# Patient Record
Sex: FEMALE | Race: AMERICAN INDIAN OR ALASKA NATIVE | NOT HISPANIC OR LATINO
[De-identification: names, ages, dates, MRNs, and addresses within clinical notes are randomized per-mention and may not be internally consistent; named-entity substitution may affect disease eponyms.]

---

## 2017-12-14 ENCOUNTER — RESULT REVIEW (OUTPATIENT)
Age: 47
End: 2017-12-14

## 2018-02-23 PROBLEM — Z00.00 ENCOUNTER FOR PREVENTIVE HEALTH EXAMINATION: Status: ACTIVE | Noted: 2018-02-23

## 2018-04-03 ENCOUNTER — APPOINTMENT (OUTPATIENT)
Dept: OBGYN | Facility: CLINIC | Age: 48
End: 2018-04-03

## 2018-05-01 ENCOUNTER — APPOINTMENT (OUTPATIENT)
Dept: OBGYN | Facility: CLINIC | Age: 48
End: 2018-05-01
Payer: COMMERCIAL

## 2018-05-01 PROCEDURE — 99204 OFFICE O/P NEW MOD 45 MIN: CPT

## 2019-03-07 ENCOUNTER — APPOINTMENT (OUTPATIENT)
Dept: OBGYN | Facility: CLINIC | Age: 49
End: 2019-03-07
Payer: COMMERCIAL

## 2019-03-07 ENCOUNTER — APPOINTMENT (OUTPATIENT)
Dept: OBGYN | Facility: CLINIC | Age: 49
End: 2019-03-07

## 2019-03-07 PROCEDURE — 99215 OFFICE O/P EST HI 40 MIN: CPT

## 2019-06-17 ENCOUNTER — OUTPATIENT (OUTPATIENT)
Dept: OUTPATIENT SERVICES | Facility: HOSPITAL | Age: 49
LOS: 1 days | End: 2019-06-17
Payer: COMMERCIAL

## 2019-06-17 VITALS
DIASTOLIC BLOOD PRESSURE: 79 MMHG | OXYGEN SATURATION: 100 % | HEIGHT: 65 IN | WEIGHT: 166.89 LBS | HEART RATE: 79 BPM | RESPIRATION RATE: 20 BRPM | SYSTOLIC BLOOD PRESSURE: 116 MMHG | TEMPERATURE: 99 F

## 2019-06-17 DIAGNOSIS — Z01.818 ENCOUNTER FOR OTHER PREPROCEDURAL EXAMINATION: ICD-10-CM

## 2019-06-17 DIAGNOSIS — D50.8 OTHER IRON DEFICIENCY ANEMIAS: ICD-10-CM

## 2019-06-17 DIAGNOSIS — D25.1 INTRAMURAL LEIOMYOMA OF UTERUS: ICD-10-CM

## 2019-06-17 DIAGNOSIS — Z98.890 OTHER SPECIFIED POSTPROCEDURAL STATES: Chronic | ICD-10-CM

## 2019-06-17 DIAGNOSIS — D25.9 LEIOMYOMA OF UTERUS, UNSPECIFIED: ICD-10-CM

## 2019-06-17 LAB
ANION GAP SERPL CALC-SCNC: 15 MMOL/L — SIGNIFICANT CHANGE UP (ref 5–17)
BLD GP AB SCN SERPL QL: NEGATIVE — SIGNIFICANT CHANGE UP
BUN SERPL-MCNC: 9 MG/DL — SIGNIFICANT CHANGE UP (ref 7–23)
CALCIUM SERPL-MCNC: 9.6 MG/DL — SIGNIFICANT CHANGE UP (ref 8.4–10.5)
CHLORIDE SERPL-SCNC: 101 MMOL/L — SIGNIFICANT CHANGE UP (ref 96–108)
CO2 SERPL-SCNC: 23 MMOL/L — SIGNIFICANT CHANGE UP (ref 22–31)
CREAT SERPL-MCNC: 0.66 MG/DL — SIGNIFICANT CHANGE UP (ref 0.5–1.3)
GLUCOSE SERPL-MCNC: 81 MG/DL — SIGNIFICANT CHANGE UP (ref 70–99)
HBA1C BLD-MCNC: 5.4 % — SIGNIFICANT CHANGE UP (ref 4–5.6)
POTASSIUM SERPL-MCNC: 4 MMOL/L — SIGNIFICANT CHANGE UP (ref 3.5–5.3)
POTASSIUM SERPL-SCNC: 4 MMOL/L — SIGNIFICANT CHANGE UP (ref 3.5–5.3)
RH IG SCN BLD-IMP: POSITIVE — SIGNIFICANT CHANGE UP
SODIUM SERPL-SCNC: 139 MMOL/L — SIGNIFICANT CHANGE UP (ref 135–145)

## 2019-06-17 PROCEDURE — 86901 BLOOD TYPING SEROLOGIC RH(D): CPT

## 2019-06-17 PROCEDURE — G0463: CPT

## 2019-06-17 PROCEDURE — 80048 BASIC METABOLIC PNL TOTAL CA: CPT

## 2019-06-17 PROCEDURE — 85027 COMPLETE CBC AUTOMATED: CPT

## 2019-06-17 PROCEDURE — 83036 HEMOGLOBIN GLYCOSYLATED A1C: CPT

## 2019-06-17 PROCEDURE — 86900 BLOOD TYPING SEROLOGIC ABO: CPT

## 2019-06-17 PROCEDURE — 86850 RBC ANTIBODY SCREEN: CPT

## 2019-06-17 RX ORDER — LIDOCAINE HCL 20 MG/ML
0.2 VIAL (ML) INJECTION ONCE
Refills: 0 | Status: DISCONTINUED | OUTPATIENT
Start: 2019-06-24 | End: 2019-06-25

## 2019-06-17 RX ORDER — CEFOTETAN DISODIUM 1 G
2 VIAL (EA) INJECTION ONCE
Refills: 0 | Status: DISCONTINUED | OUTPATIENT
Start: 2019-06-24 | End: 2019-06-25

## 2019-06-17 RX ORDER — SODIUM CHLORIDE 9 MG/ML
3 INJECTION INTRAMUSCULAR; INTRAVENOUS; SUBCUTANEOUS EVERY 8 HOURS
Refills: 0 | Status: DISCONTINUED | OUTPATIENT
Start: 2019-06-24 | End: 2019-06-25

## 2019-06-17 RX ORDER — CHOLECALCIFEROL (VITAMIN D3) 125 MCG
1 CAPSULE ORAL
Qty: 0 | Refills: 0 | DISCHARGE

## 2019-06-17 NOTE — H&P PST ADULT - NSICDXPASTSURGICALHX_GEN_ALL_CORE_FT
PAST SURGICAL HISTORY:  S/P  Section 2003 PAST SURGICAL HISTORY:  H/O carpal tunnel repair right    History of varicose vein stripping     S/P  Section

## 2019-06-17 NOTE — H&P PST ADULT - HISTORY OF PRESENT ILLNESS
49 year old female with h/o intramural leiomyoma of uterus, is been seeing in preadmission testing for scheduled total laparoscopic hysterectomy, laparoscopic bilateral salpingectomy, possible exploratory laparotomy. Her past medical history also includes iron deficiency anemia, goiter, varicose veins, constipation, occasional heartburn.

## 2019-06-17 NOTE — H&P PST ADULT - NSICDXPROBLEM_GEN_ALL_CORE_FT
PROBLEM DIAGNOSES  Problem: Uterine leiomyoma  Assessment and Plan: scheduled for total laparoscopic hysterectomy, laparoscopic bilateral salpingectomy, possible exploratory laparotomy.  ucg on admit, urine cup given  fingerstick on admit  preop instruction including chlorhexidine wash instruction given     Problem: Other iron deficiency anemia  Assessment and Plan: monitor

## 2019-06-17 NOTE — H&P PST ADULT - NSANTHOSAYNRD_GEN_A_CORE
No. CANDIDO screening performed.  STOP BANG Legend: 0-2 = LOW Risk; 3-4 = INTERMEDIATE Risk; 5-8 = HIGH Risk

## 2019-06-17 NOTE — H&P PST ADULT - NSICDXPASTMEDICALHX_GEN_ALL_CORE_FT
PAST MEDICAL HISTORY:   History Elective fetus + Down Syndrom 2010    Bilateral dry eyes     Dry skin     Febrile seizure as a child    Fibroids     Goiter as a child    H/O varicose veins     Heartburn

## 2019-06-18 LAB
HCT VFR BLD CALC: 38.2 % — SIGNIFICANT CHANGE UP (ref 34.5–45)
HGB BLD-MCNC: 11.7 G/DL — SIGNIFICANT CHANGE UP (ref 11.5–15.5)
MCHC RBC-ENTMCNC: 27 PG — SIGNIFICANT CHANGE UP (ref 27–34)
MCHC RBC-ENTMCNC: 30.6 GM/DL — LOW (ref 32–36)
MCV RBC AUTO: 88 FL — SIGNIFICANT CHANGE UP (ref 80–100)
PLATELET # BLD AUTO: 265 K/UL — SIGNIFICANT CHANGE UP (ref 150–400)
RBC # BLD: 4.34 M/UL — SIGNIFICANT CHANGE UP (ref 3.8–5.2)
RBC # FLD: 15.1 % — HIGH (ref 10.3–14.5)
WBC # BLD: 11.61 K/UL — HIGH (ref 3.8–10.5)
WBC # FLD AUTO: 11.61 K/UL — HIGH (ref 3.8–10.5)

## 2019-06-23 ENCOUNTER — TRANSCRIPTION ENCOUNTER (OUTPATIENT)
Age: 49
End: 2019-06-23

## 2019-06-24 ENCOUNTER — RESULT REVIEW (OUTPATIENT)
Age: 49
End: 2019-06-24

## 2019-06-24 ENCOUNTER — INPATIENT (INPATIENT)
Facility: HOSPITAL | Age: 49
LOS: 0 days | Discharge: HOME | DRG: 743 | End: 2019-06-25
Attending: OBSTETRICS & GYNECOLOGY | Admitting: OBSTETRICS & GYNECOLOGY
Payer: COMMERCIAL

## 2019-06-24 VITALS
OXYGEN SATURATION: 100 % | TEMPERATURE: 98 F | RESPIRATION RATE: 20 BRPM | HEIGHT: 65 IN | WEIGHT: 166.89 LBS | DIASTOLIC BLOOD PRESSURE: 78 MMHG | HEART RATE: 77 BPM | SYSTOLIC BLOOD PRESSURE: 120 MMHG

## 2019-06-24 DIAGNOSIS — D25.1 INTRAMURAL LEIOMYOMA OF UTERUS: ICD-10-CM

## 2019-06-24 DIAGNOSIS — Z98.890 OTHER SPECIFIED POSTPROCEDURAL STATES: Chronic | ICD-10-CM

## 2019-06-24 LAB
GLUCOSE BLDC GLUCOMTR-MCNC: 83 MG/DL — SIGNIFICANT CHANGE UP (ref 70–99)
HCG UR QL: NEGATIVE — SIGNIFICANT CHANGE UP
HCT VFR BLD CALC: 30.8 % — LOW (ref 34.5–45)
HGB BLD-MCNC: 10 G/DL — LOW (ref 11.5–15.5)
MCHC RBC-ENTMCNC: 27.5 PG — SIGNIFICANT CHANGE UP (ref 27–34)
MCHC RBC-ENTMCNC: 32.3 GM/DL — SIGNIFICANT CHANGE UP (ref 32–36)
MCV RBC AUTO: 85.2 FL — SIGNIFICANT CHANGE UP (ref 80–100)
PLATELET # BLD AUTO: 273 K/UL — SIGNIFICANT CHANGE UP (ref 150–400)
RBC # BLD: 3.62 M/UL — LOW (ref 3.8–5.2)
RBC # FLD: 14.3 % — SIGNIFICANT CHANGE UP (ref 10.3–14.5)
WBC # BLD: 17.3 K/UL — HIGH (ref 3.8–10.5)
WBC # FLD AUTO: 17.3 K/UL — HIGH (ref 3.8–10.5)

## 2019-06-24 PROCEDURE — 50715 RELEASE OF URETER: CPT | Mod: RT

## 2019-06-24 PROCEDURE — 88302 TISSUE EXAM BY PATHOLOGIST: CPT | Mod: 26

## 2019-06-24 PROCEDURE — 58542 LSH W/T/O UT 250 G OR LESS: CPT

## 2019-06-24 PROCEDURE — 88307 TISSUE EXAM BY PATHOLOGIST: CPT | Mod: 26

## 2019-06-24 RX ORDER — HYDROMORPHONE HYDROCHLORIDE 2 MG/ML
0.5 INJECTION INTRAMUSCULAR; INTRAVENOUS; SUBCUTANEOUS
Refills: 0 | Status: DISCONTINUED | OUTPATIENT
Start: 2019-06-24 | End: 2019-06-25

## 2019-06-24 RX ORDER — KETOROLAC TROMETHAMINE 30 MG/ML
30 SYRINGE (ML) INJECTION EVERY 8 HOURS
Refills: 0 | Status: DISCONTINUED | OUTPATIENT
Start: 2019-06-24 | End: 2019-06-24

## 2019-06-24 RX ORDER — ONDANSETRON 8 MG/1
4 TABLET, FILM COATED ORAL ONCE
Refills: 0 | Status: DISCONTINUED | OUTPATIENT
Start: 2019-06-24 | End: 2019-06-25

## 2019-06-24 RX ORDER — SODIUM CHLORIDE 9 MG/ML
1000 INJECTION, SOLUTION INTRAVENOUS
Refills: 0 | Status: DISCONTINUED | OUTPATIENT
Start: 2019-06-24 | End: 2019-06-25

## 2019-06-24 RX ORDER — ENOXAPARIN SODIUM 100 MG/ML
40 INJECTION SUBCUTANEOUS DAILY
Refills: 0 | Status: DISCONTINUED | OUTPATIENT
Start: 2019-06-25 | End: 2019-06-25

## 2019-06-24 RX ORDER — CELECOXIB 200 MG/1
200 CAPSULE ORAL ONCE
Refills: 0 | Status: COMPLETED | OUTPATIENT
Start: 2019-06-24 | End: 2019-06-24

## 2019-06-24 RX ORDER — ACETAMINOPHEN 500 MG
975 TABLET ORAL ONCE
Refills: 0 | Status: COMPLETED | OUTPATIENT
Start: 2019-06-24 | End: 2019-06-24

## 2019-06-24 RX ORDER — ACETAMINOPHEN 500 MG
1000 TABLET ORAL EVERY 6 HOURS
Refills: 0 | Status: DISCONTINUED | OUTPATIENT
Start: 2019-06-24 | End: 2019-06-25

## 2019-06-24 RX ORDER — CHLORHEXIDINE GLUCONATE 213 G/1000ML
1 SOLUTION TOPICAL ONCE
Refills: 0 | Status: COMPLETED | OUTPATIENT
Start: 2019-06-24 | End: 2019-06-24

## 2019-06-24 RX ORDER — FAMOTIDINE 10 MG/ML
20 INJECTION INTRAVENOUS ONCE
Refills: 0 | Status: COMPLETED | OUTPATIENT
Start: 2019-06-24 | End: 2019-06-24

## 2019-06-24 RX ORDER — HEPARIN SODIUM 5000 [USP'U]/ML
5000 INJECTION INTRAVENOUS; SUBCUTANEOUS EVERY 12 HOURS
Refills: 0 | Status: COMPLETED | OUTPATIENT
Start: 2019-06-24 | End: 2019-06-24

## 2019-06-24 RX ORDER — HEPARIN SODIUM 5000 [USP'U]/ML
5000 INJECTION INTRAVENOUS; SUBCUTANEOUS ONCE
Refills: 0 | Status: DISCONTINUED | OUTPATIENT
Start: 2019-06-24 | End: 2019-06-24

## 2019-06-24 RX ORDER — OXYCODONE HYDROCHLORIDE 5 MG/1
5 TABLET ORAL EVERY 4 HOURS
Refills: 0 | Status: DISCONTINUED | OUTPATIENT
Start: 2019-06-24 | End: 2019-06-24

## 2019-06-24 RX ADMIN — HYDROMORPHONE HYDROCHLORIDE 0.5 MILLIGRAM(S): 2 INJECTION INTRAMUSCULAR; INTRAVENOUS; SUBCUTANEOUS at 18:45

## 2019-06-24 RX ADMIN — HYDROMORPHONE HYDROCHLORIDE 0.5 MILLIGRAM(S): 2 INJECTION INTRAMUSCULAR; INTRAVENOUS; SUBCUTANEOUS at 18:30

## 2019-06-24 RX ADMIN — HEPARIN SODIUM 5000 UNIT(S): 5000 INJECTION INTRAVENOUS; SUBCUTANEOUS at 22:04

## 2019-06-24 RX ADMIN — SODIUM CHLORIDE 3 MILLILITER(S): 9 INJECTION INTRAMUSCULAR; INTRAVENOUS; SUBCUTANEOUS at 21:09

## 2019-06-24 RX ADMIN — CELECOXIB 200 MILLIGRAM(S): 200 CAPSULE ORAL at 12:31

## 2019-06-24 RX ADMIN — SODIUM CHLORIDE 125 MILLILITER(S): 9 INJECTION, SOLUTION INTRAVENOUS at 19:23

## 2019-06-24 RX ADMIN — FAMOTIDINE 20 MILLIGRAM(S): 10 INJECTION INTRAVENOUS at 12:31

## 2019-06-24 RX ADMIN — SODIUM CHLORIDE 125 MILLILITER(S): 9 INJECTION, SOLUTION INTRAVENOUS at 22:04

## 2019-06-24 RX ADMIN — CHLORHEXIDINE GLUCONATE 1 APPLICATION(S): 213 SOLUTION TOPICAL at 12:31

## 2019-06-24 NOTE — BRIEF OPERATIVE NOTE - NSICDXBRIEFPROCEDURE_GEN_ALL_CORE_FT
PROCEDURES:  Right ureterolysis 24-Jun-2019 17:12:02  Grisel Carmona  Laparoscopic lysis of adhesions of pelvis 24-Jun-2019 17:11:05  Grisel Carmona  Laparoscopic supracervical hysterectomy with salpingectomy 24-Jun-2019 17:10:13  Grisel Carmona

## 2019-06-24 NOTE — PRE-ANESTHESIA EVALUATION ADULT - NSPROPOSEDPROCEDFT_GEN_ALL_CORE
total laparoscopic hysterectomy, laparoscopic bilateral salpingooophorectomy, possible exploratory laparotomy Total Laparoscopic Hysterectomy, Laparoscopic Bilateral Salpingooophorectomy, possible exploratory laparotomy

## 2019-06-24 NOTE — BRIEF OPERATIVE NOTE - NSICDXBRIEFPOSTOP_GEN_ALL_CORE_FT
POST-OP DIAGNOSIS:  Menorrhagia 24-Jun-2019 17:10:44  Grisel Carmona  Fibroid uterus 24-Jun-2019 17:10:50  Grisel Carmona

## 2019-06-24 NOTE — CHART NOTE - NSCHARTNOTEFT_GEN_A_CORE
R1 OBGYN Post-operative Note: POD#0  HD#1    Patient seen and examined at bedside.  No acute events overnight. No acute complaints.  Pain well controlled.  Patient is ambulating and tolerating.....   Has not yet passed flatus vs. Patient is passing flatus.    Patient is voiding spontaneously vs. Villanueva is still in place.   Denies CP, SOB, N/V, fevers, and chills.    Vital Signs Last 24 Hours  T(C): 36.9 (06-24-19 @ 19:30), Max: 36.9 (06-24-19 @ 19:30)  HR: 77 (06-24-19 @ 19:45) (74 - 96)  BP: 101/58 (06-24-19 @ 19:45) (92/53 - 120/78)  RR: 16 (06-24-19 @ 19:45) (15 - 20)  SpO2: 99% (06-24-19 @ 19:45) (95% - 100%)    I&O's Summary    24 Jun 2019 07:01  -  24 Jun 2019 21:37  --------------------------------------------------------  IN: 600 mL / OUT: 0 mL / NET: 600 mL        Physical Exam:  General: NAD  CV: RR, S1, S2, no M/R/G  Lungs: CTA b/l, good air flow b/l   Abdomen: Soft, mildly-tender to palpation diffusely, softly distended, normoactive bowel sounds  Incision: _ CDI  : no bleeding on pad  Ext: No pain or swelling     Labs:                        10.0   17.3  )-----------( 273      ( 24 Jun 2019 20:34 )             30.8   baso x      eos x      imm gran x      lymph x      mono x      poly x          MEDICATIONS  (STANDING):  acetaminophen  IVPB .. 1000 milliGRAM(s) IV Intermittent every 6 hours  cefoTEtan  IVPB 2 Gram(s) IV Intermittent once  heparin  Injectable 5000 Unit(s) SubCutaneous every 12 hours  lactated ringers. 1000 milliLiter(s) (125 mL/Hr) IV Continuous <Continuous>  lidocaine 1% Injectable 0.2 milliLiter(s) Local Injection once  oxyCODONE    IR 5 milliGRAM(s) Oral every 4 hours  sodium chloride 0.9% lock flush 3 milliLiter(s) IV Push every 8 hours    MEDICATIONS  (PRN):  HYDROmorphone  Injectable 0.5 milliGRAM(s) IV Push every 10 minutes PRN Moderate to severe Pain (4 - 10)  ondansetron Injectable 4 milliGRAM(s) IV Push once PRN Nausea and/or Vomiting      A/P: 49y POD#  s/p     for .  Patient is stable and doing well.      Neuro: PO pain meds.   CV: Hemodynamically stable  Pulm: Saturating well on room air, encourage oob/amb  GI: Advance to regular diet vs. Continue regular diet  : UOP adequate, d/c villanueva   vs. Voiding spontaneously   Heme: c/w HSQ and SCDs for DVT ppx  FEN: LR@125.  replete electrolytes prn   ID: Afebrile  Endo: No active issues   Dispo: Continue routine post-op care    Ameena Del Cid PGY-1 R1 OBGYN Post-operative Note: POD#0  HD#1    Patient seen and examined at bedside. No acute complaints. Abdominal pain well controlled. Patient has not been out of bed. Is tolerating clear liquids. Due to void. Denies CP, SOB, N/V, fevers, and chills.    Vital Signs Last 24 Hours  T(C): 36.9 (06-24-19 @ 19:30), Max: 36.9 (06-24-19 @ 19:30)  HR: 77 (06-24-19 @ 19:45) (74 - 96)  BP: 101/58 (06-24-19 @ 19:45) (92/53 - 120/78)  RR: 16 (06-24-19 @ 19:45) (15 - 20)  SpO2: 99% (06-24-19 @ 19:45) (95% - 100%)    I&O's Summary    24 Jun 2019 07:01  -  24 Jun 2019 21:37  --------------------------------------------------------  IN: 600 mL / OUT: 0 mL / NET: 600 mL      Physical Exam:  General: NAD  CV: RR, S1, S2, no M/R/G  Lungs: CTA b/l, good air flow b/l   Abdomen: Soft, mildly-tender to palpation diffusely, softly distended  Incision: Four LSC incisions are CDI; mini-lap CDI  : no bleeding on pad  Ext: No pain or swelling     Labs:                        10.0   17.3  )-----------( 273      ( 24 Jun 2019 20:34 )             30.8   baso x      eos x      imm gran x      lymph x      mono x      poly x          MEDICATIONS  (STANDING):  acetaminophen  IVPB .. 1000 milliGRAM(s) IV Intermittent every 6 hours  cefoTEtan  IVPB 2 Gram(s) IV Intermittent once  heparin  Injectable 5000 Unit(s) SubCutaneous every 12 hours  lactated ringers. 1000 milliLiter(s) (125 mL/Hr) IV Continuous <Continuous>  lidocaine 1% Injectable 0.2 milliLiter(s) Local Injection once  oxyCODONE    IR 5 milliGRAM(s) Oral every 4 hours  sodium chloride 0.9% lock flush 3 milliLiter(s) IV Push every 8 hours    MEDICATIONS  (PRN):  HYDROmorphone  Injectable 0.5 milliGRAM(s) IV Push every 10 minutes PRN Moderate to severe Pain (4 - 10)  ondansetron Injectable 4 milliGRAM(s) IV Push once PRN Nausea and/or Vomiting      A/P: 49y POD#  s/p LSC Supracervical Hysterectomy, Bilateral salpingectomy, and mini-laparotomy for fibroid uterus.  Patient is stable and doing well post-operatively.    Neuro: IV Tylenol, IV toradol x2 doses; will transition to PO meds in AM  CV: Hemodynamically stable  Pulm: Saturating well on room air, encourage oob/amb  GI: Continue regular diet  : Due to void  Heme: Follwing one dose of HSQ, patient will receive Lovenox 40 daily starting tomorrow 7am; continue SCDs for DVT ppx  FEN: LR@125.  Replete electrolytes prn   ID: Afebrile  Endo: No active issues   Dispo: Continue routine post-op care    Ameena Del Cid PGY-2

## 2019-06-24 NOTE — BRIEF OPERATIVE NOTE - NSICDXBRIEFPREOP_GEN_ALL_CORE_FT
PRE-OP DIAGNOSIS:  Menorrhagia 24-Jun-2019 17:10:33  Grisel Carmona  Fibroid uterus 24-Jun-2019 17:10:27  Grisel Carmona

## 2019-06-24 NOTE — BRIEF OPERATIVE NOTE - OPERATION/FINDINGS
approximately 16-18 week size multifibroid uterus adherent to anterior abdominal wall, bilateral adenxa wnl, Right pelvic side wall opened and ureter identified, left ureter identified, bladder adherent to BETH back filled and taken down, mini-lap performed for specimen removal, evicel applied over operative bed

## 2019-06-25 ENCOUNTER — TRANSCRIPTION ENCOUNTER (OUTPATIENT)
Age: 49
End: 2019-06-25

## 2019-06-25 VITALS
RESPIRATION RATE: 18 BRPM | DIASTOLIC BLOOD PRESSURE: 72 MMHG | SYSTOLIC BLOOD PRESSURE: 115 MMHG | TEMPERATURE: 98 F | OXYGEN SATURATION: 94 % | HEART RATE: 95 BPM

## 2019-06-25 DIAGNOSIS — Z98.890 OTHER SPECIFIED POSTPROCEDURAL STATES: ICD-10-CM

## 2019-06-25 LAB
HCT VFR BLD CALC: 27.7 % — LOW (ref 34.5–45)
HCT VFR BLD CALC: 28.9 % — LOW (ref 34.5–45)
HGB BLD-MCNC: 9 G/DL — LOW (ref 11.5–15.5)
HGB BLD-MCNC: 9.7 G/DL — LOW (ref 11.5–15.5)
MCHC RBC-ENTMCNC: 28 PG — SIGNIFICANT CHANGE UP (ref 27–34)
MCHC RBC-ENTMCNC: 28.8 PG — SIGNIFICANT CHANGE UP (ref 27–34)
MCHC RBC-ENTMCNC: 32.6 GM/DL — SIGNIFICANT CHANGE UP (ref 32–36)
MCHC RBC-ENTMCNC: 33.4 GM/DL — SIGNIFICANT CHANGE UP (ref 32–36)
MCV RBC AUTO: 85.9 FL — SIGNIFICANT CHANGE UP (ref 80–100)
MCV RBC AUTO: 86 FL — SIGNIFICANT CHANGE UP (ref 80–100)
PLATELET # BLD AUTO: 235 K/UL — SIGNIFICANT CHANGE UP (ref 150–400)
PLATELET # BLD AUTO: 263 K/UL — SIGNIFICANT CHANGE UP (ref 150–400)
RBC # BLD: 3.22 M/UL — LOW (ref 3.8–5.2)
RBC # BLD: 3.36 M/UL — LOW (ref 3.8–5.2)
RBC # FLD: 14.2 % — SIGNIFICANT CHANGE UP (ref 10.3–14.5)
RBC # FLD: 14.3 % — SIGNIFICANT CHANGE UP (ref 10.3–14.5)
WBC # BLD: 12.2 K/UL — HIGH (ref 3.8–10.5)
WBC # BLD: 12.8 K/UL — HIGH (ref 3.8–10.5)
WBC # FLD AUTO: 12.2 K/UL — HIGH (ref 3.8–10.5)
WBC # FLD AUTO: 12.8 K/UL — HIGH (ref 3.8–10.5)

## 2019-06-25 PROCEDURE — 82962 GLUCOSE BLOOD TEST: CPT

## 2019-06-25 PROCEDURE — 85027 COMPLETE CBC AUTOMATED: CPT

## 2019-06-25 PROCEDURE — C1889: CPT

## 2019-06-25 PROCEDURE — 88302 TISSUE EXAM BY PATHOLOGIST: CPT

## 2019-06-25 PROCEDURE — 81025 URINE PREGNANCY TEST: CPT

## 2019-06-25 PROCEDURE — 88307 TISSUE EXAM BY PATHOLOGIST: CPT

## 2019-06-25 RX ORDER — HYDROMORPHONE HYDROCHLORIDE 2 MG/ML
2 INJECTION INTRAMUSCULAR; INTRAVENOUS; SUBCUTANEOUS EVERY 4 HOURS
Refills: 0 | Status: DISCONTINUED | OUTPATIENT
Start: 2019-06-25 | End: 2019-06-25

## 2019-06-25 RX ORDER — ENOXAPARIN SODIUM 100 MG/ML
40 INJECTION SUBCUTANEOUS
Qty: 14 | Refills: 0
Start: 2019-06-25 | End: 2019-07-08

## 2019-06-25 RX ORDER — IBUPROFEN 200 MG
600 TABLET ORAL EVERY 6 HOURS
Refills: 0 | Status: DISCONTINUED | OUTPATIENT
Start: 2019-06-25 | End: 2019-06-25

## 2019-06-25 RX ORDER — IBUPROFEN 200 MG
3 TABLET ORAL
Qty: 0 | Refills: 0 | DISCHARGE

## 2019-06-25 RX ORDER — ACETAMINOPHEN 500 MG
650 TABLET ORAL EVERY 6 HOURS
Refills: 0 | Status: DISCONTINUED | OUTPATIENT
Start: 2019-06-25 | End: 2019-06-25

## 2019-06-25 RX ORDER — HYDROMORPHONE HYDROCHLORIDE 2 MG/ML
1 INJECTION INTRAMUSCULAR; INTRAVENOUS; SUBCUTANEOUS
Qty: 10 | Refills: 0
Start: 2019-06-25

## 2019-06-25 RX ORDER — HYDROMORPHONE HYDROCHLORIDE 2 MG/ML
1 INJECTION INTRAMUSCULAR; INTRAVENOUS; SUBCUTANEOUS
Qty: 0 | Refills: 0 | DISCHARGE

## 2019-06-25 RX ADMIN — Medication 600 MILLIGRAM(S): at 11:52

## 2019-06-25 RX ADMIN — ENOXAPARIN SODIUM 40 MILLIGRAM(S): 100 INJECTION SUBCUTANEOUS at 11:22

## 2019-06-25 RX ADMIN — Medication 600 MILLIGRAM(S): at 11:22

## 2019-06-25 RX ADMIN — SODIUM CHLORIDE 3 MILLILITER(S): 9 INJECTION INTRAMUSCULAR; INTRAVENOUS; SUBCUTANEOUS at 04:41

## 2019-06-25 NOTE — PROGRESS NOTE ADULT - ATTENDING COMMENTS
Patient seen and examined at bedside, agree with above assessment. AM CBC reviewed, will repeat at 11am, patient asymptomatic, denies CP, SOB, N,V, leg pain. ambulating without difficultly. tolerating regular diet. Patient to go home on Lovenox 40mg daily x 2 weeks, all questions answered. dispo pending 11am CBC.

## 2019-06-25 NOTE — DISCHARGE NOTE PROVIDER - CARE PROVIDER_API CALL
Henrik Alonzo)  Obstetrics and Gynecology  [address] [place] [address], [address]  [place] [address]  Phone: [phone]  Fax: [phone]  Follow Up Time:

## 2019-06-25 NOTE — PROGRESS NOTE ADULT - PROBLEM SELECTOR PLAN 1
Neuro: c/w IV Tylenol   CV: VSS, CBC in AM. Hct: 38->21  Pulm: Saturating well on room air, encourage incentive spirometry  GI: C/w reg diet  : UOP adequate, c/w villanueva until POD#1. Will d/c villanueva after AM labs  Heme: HSQ and SCDs for DVT ppx  Dispo: Continue routine post-op care    Gissel, pgy2 Neuro: c/w IV Tylenol   CV: VSS, CBC in AM. Hct: 38->27.7  Pulm: Saturating well on room air, encourage incentive spirometry  GI: C/w reg diet  : UOP adequate voiding freely  Heme: HSQ and SCDs for DVT ppx  Dispo: Continue routine post-op care    Gissel, pgy2

## 2019-06-25 NOTE — DISCHARGE NOTE PROVIDER - NSDCFUADDINST_GEN_ALL_CORE_FT
Nothing in the vagina for 6 weeks including no tampons, no douching, no tub baths and no intercourse.  Please call the office or go to the Emergency Room if you have any of the following: fever over 100.4F, pain not controlled by oral pain medications, difficulty urinating, severe vaginal bleeding more than 1 pad per hour, or for any other concerns.

## 2019-06-25 NOTE — PROGRESS NOTE ADULT - ASSESSMENT
Pt is 50yo POD#1 s/p LSC Supracervical Hysterectomy, BS, Minilap. Pt is HD stable. Pain is well controlled.

## 2019-06-25 NOTE — DISCHARGE NOTE NURSING/CASE MANAGEMENT/SOCIAL WORK - NSDCDPATPORTLINK_GEN_ALL_CORE
You can access the [facility] Portal, offered by [facility], by registering with the following website: [email or web address]

## 2019-06-25 NOTE — DISCHARGE NOTE PROVIDER - NSDCCPTREATMENT_GEN_ALL_CORE_FT
PRINCIPAL PROCEDURE  Procedure: Laparoscopic supracervical hysterectomy with salpingectomy  Findings and Treatment:

## 2019-06-25 NOTE — PROGRESS NOTE ADULT - SUBJECTIVE AND OBJECTIVE BOX
R2 GYN NOTE  POD# 1  HD#2    Patient seen and examined at bedside, no acute overnight events. No acute complaints, pain well controlled.  Patient has not yet ambulated, not yet passing flatus, villanueva catheter in place, and tolerating regular diet. Denies CP, SOB, N/V, fevers, and chills.    Vital Signs Last 24 Hours  T(C): 37 (06-25-19 @ 05:36), Max: 37.2 (06-24-19 @ 22:00)  HR: 82 (06-25-19 @ 05:36) (74 - 96)  BP: 99/65 (06-25-19 @ 05:36) (91/59 - 120/78)  RR: 18 (06-25-19 @ 05:36) (15 - 20)  SpO2: 95% (06-25-19 @ 05:36) (95% - 100%)    I&O's Summary    24 Jun 2019 07:01  -  25 Jun 2019 06:21  --------------------------------------------------------  IN: 1090 mL / OUT: 1100 mL / NET: -10 mL        Physical Exam:  General: NAD  CV: NR, RR, S1, S2, no M/R/G  Lungs: CTA-B  Abdomen: Soft, non-tender, non-distended, +BS  Incision: lsc sites and mini-lap incision CDI  Ext: No pain or swelling    Labs:                        10.0   17.3  )-----------( 273      ( 24 Jun 2019 20:34 )             30.8   baso x      eos x      imm gran x      lymph x      mono x      poly x                    Blood Type: O Positive      MEDICATIONS  (STANDING):  acetaminophen  IVPB .. 1000 milliGRAM(s) IV Intermittent every 6 hours  cefoTEtan  IVPB 2 Gram(s) IV Intermittent once  enoxaparin Injectable 40 milliGRAM(s) SubCutaneous daily  lidocaine 1% Injectable 0.2 milliLiter(s) Local Injection once  sodium chloride 0.9% lock flush 3 milliLiter(s) IV Push every 8 hours    MEDICATIONS  (PRN): R2 GYN NOTE  POD# 1  HD#2    Patient seen and examined at bedside, no acute overnight events. No acute complaints, pain well controlled.  Patient has not yet ambulated, not yet passing flatus, tolerating regular diet. Denies CP, SOB, N/V, fevers, and chills, leg pain.    Vital Signs Last 24 Hours  T(C): 37 (06-25-19 @ 05:36), Max: 37.2 (06-24-19 @ 22:00)  HR: 82 (06-25-19 @ 05:36) (74 - 96)  BP: 99/65 (06-25-19 @ 05:36) (91/59 - 120/78)  RR: 18 (06-25-19 @ 05:36) (15 - 20)  SpO2: 95% (06-25-19 @ 05:36) (95% - 100%)    I&O's Summary    24 Jun 2019 07:01  -  25 Jun 2019 06:21  --------------------------------------------------------  IN: 1090 mL / OUT: 1100 mL / NET: -10 mL        Physical Exam:  General: NAD  CV: NR, RR, S1, S2, no M/R/G  Lungs: CTA-B  Abdomen: Soft, non-tender, non-distended, +BS  Incision: lsc sites and mini-lap incision CDI  Ext: No pain or swelling, SCD on and functioning     Labs:                        10.0   17.3  )-----------( 273      ( 24 Jun 2019 20:34 )             30.8   baso x      eos x      imm gran x      lymph x      mono x      poly x                                    9.0    12.2  )-----------( 235      ( 25 Jun 2019 06:56 )             27.7         Blood Type: O Positive      MEDICATIONS  (STANDING):  acetaminophen  IVPB .. 1000 milliGRAM(s) IV Intermittent every 6 hours  cefoTEtan  IVPB 2 Gram(s) IV Intermittent once  enoxaparin Injectable 40 milliGRAM(s) SubCutaneous daily  lidocaine 1% Injectable 0.2 milliLiter(s) Local Injection once  sodium chloride 0.9% lock flush 3 milliLiter(s) IV Push every 8 hours    MEDICATIONS  (PRN):

## 2019-07-03 ENCOUNTER — APPOINTMENT (OUTPATIENT)
Dept: OBGYN | Facility: CLINIC | Age: 49
End: 2019-07-03

## 2019-07-03 PROBLEM — Z86.79 PERSONAL HISTORY OF OTHER DISEASES OF THE CIRCULATORY SYSTEM: Chronic | Status: ACTIVE | Noted: 2019-06-17

## 2019-07-03 PROBLEM — R12 HEARTBURN: Chronic | Status: ACTIVE | Noted: 2019-06-17

## 2019-07-03 PROBLEM — R56.00 SIMPLE FEBRILE CONVULSIONS: Chronic | Status: ACTIVE | Noted: 2019-06-17

## 2019-07-03 PROBLEM — L85.3 XEROSIS CUTIS: Chronic | Status: ACTIVE | Noted: 2019-06-17

## 2019-07-03 PROBLEM — H04.123 DRY EYE SYNDROME OF BILATERAL LACRIMAL GLANDS: Chronic | Status: ACTIVE | Noted: 2019-06-17

## 2019-07-10 ENCOUNTER — APPOINTMENT (OUTPATIENT)
Dept: OBGYN | Facility: CLINIC | Age: 49
End: 2019-07-10
Payer: COMMERCIAL

## 2019-07-10 PROCEDURE — 99024 POSTOP FOLLOW-UP VISIT: CPT

## 2019-07-17 LAB — SURGICAL PATHOLOGY STUDY: SIGNIFICANT CHANGE UP

## 2019-07-25 ENCOUNTER — APPOINTMENT (OUTPATIENT)
Dept: OBGYN | Facility: CLINIC | Age: 49
End: 2019-07-25
Payer: COMMERCIAL

## 2019-07-25 PROCEDURE — 99024 POSTOP FOLLOW-UP VISIT: CPT

## 2019-08-22 ENCOUNTER — APPOINTMENT (OUTPATIENT)
Dept: OBGYN | Facility: CLINIC | Age: 49
End: 2019-08-22
Payer: COMMERCIAL

## 2019-08-22 PROCEDURE — 99024 POSTOP FOLLOW-UP VISIT: CPT

## 2020-05-05 ENCOUNTER — APPOINTMENT (OUTPATIENT)
Dept: ENDOCRINOLOGY | Facility: CLINIC | Age: 50
End: 2020-05-05

## 2021-06-16 ENCOUNTER — RESULT REVIEW (OUTPATIENT)
Age: 51
End: 2021-06-16

## 2022-08-09 NOTE — H&P PST ADULT - LAST CARDIAC ANGIOGRAM/IMAGING
What Type Of Note Output Would You Prefer (Optional)?: Standard Output
Have Your Spot(S) Been Treated In The Past?: has not been treated
Hpi Title: Evaluation of Skin Lesions
denies

## 2022-09-15 NOTE — H&P PST ADULT - ATTENDING COMMENTS
Pt transferred to  on continuous cardiac monitoring and room air via wheel chair. Heparin gtt continued as ordered. Family with patient during transfer. Patient and family member oriented to room. Hand off to CSU RN.   pt with large fibroids and hx of vascular venous insufficiency for TLH BS possible LSH possible expl lap the risks and alternatives have been discussed.

## 2024-02-27 ENCOUNTER — LABORATORY RESULT (OUTPATIENT)
Age: 54
End: 2024-02-27

## 2024-02-27 ENCOUNTER — APPOINTMENT (OUTPATIENT)
Dept: GASTROENTEROLOGY | Facility: CLINIC | Age: 54
End: 2024-02-27
Payer: COMMERCIAL

## 2024-02-27 VITALS
DIASTOLIC BLOOD PRESSURE: 60 MMHG | WEIGHT: 144 LBS | HEIGHT: 64.5 IN | SYSTOLIC BLOOD PRESSURE: 120 MMHG | BODY MASS INDEX: 24.29 KG/M2 | HEART RATE: 81 BPM | TEMPERATURE: 97.3 F | OXYGEN SATURATION: 99 %

## 2024-02-27 DIAGNOSIS — Z78.9 OTHER SPECIFIED HEALTH STATUS: ICD-10-CM

## 2024-02-27 DIAGNOSIS — Z82.49 FAMILY HISTORY OF ISCHEMIC HEART DISEASE AND OTHER DISEASES OF THE CIRCULATORY SYSTEM: ICD-10-CM

## 2024-02-27 DIAGNOSIS — K80.20 CALCULUS OF GALLBLADDER W/OUT CHOLECYSTITIS W/OUT OBSTRUCTION: ICD-10-CM

## 2024-02-27 PROCEDURE — 99203 OFFICE O/P NEW LOW 30 MIN: CPT

## 2024-02-27 RX ORDER — METHIMAZOLE 5 MG/1
5 TABLET ORAL
Refills: 0 | Status: ACTIVE | COMMUNITY

## 2024-02-27 RX ORDER — CHOLECALCIFEROL (VITAMIN D3) 25 MCG
TABLET ORAL
Refills: 0 | Status: ACTIVE | COMMUNITY

## 2024-02-27 RX ORDER — OMEPRAZOLE 20 MG/1
20 CAPSULE, DELAYED RELEASE ORAL
Qty: 90 | Refills: 0 | Status: ACTIVE | COMMUNITY
Start: 2024-02-27 | End: 1900-01-01

## 2024-02-27 NOTE — HISTORY OF PRESENT ILLNESS
[FreeTextEntry1] : Patient came to the office today to establish a relationship.  She has previous GI history including gallstones and gallbladder polyps as well as gastritis diagnosed on previous endoscopy as well as benign-appearing gastric ulcer.  Last examination was performed in March 2023.  Patient states that she is up-to-date on colonoscopy.  Patient is experiencing occasional dyspepsia and has been off of PPI for several months.  She denies associated nausea vomiting fever chills rectal bleeding or melena.  Blood work had shown an elevated IgA for celiac disease but subsequent endoscopic biopsies were negative for villous atrophy.

## 2024-02-27 NOTE — ASSESSMENT
[FreeTextEntry1] : Impression and plan Patient came to the office today to establish relationship.  She has been experiencing some dyspeptic complaints.  I will restart PPI 20 mg daily for a few weeks to see if this helps.  If patient has continued symptoms and upper endoscopy will be suggested.  Abdominal sonogram will be obtained.  Blood work will be drawn to check for celiac antibodies.  Patient will call back for these results.  Further suggestions will follow depending upon patient's symptomatology.  Risks benefits alternatives of upper endoscopy were discussed.

## 2024-02-27 NOTE — PHYSICAL EXAM
[Alert] : alert [Healthy Appearing] : healthy appearing [Normal Voice/Communication] : normal voice/communication [No Acute Distress] : no acute distress [Sclera] : the sclera and conjunctiva were normal [Hearing Threshold Finger Rub Not [place]] : hearing was normal [Normal Lips/Gums] : the lips and gums were normal [Oropharynx] : the oropharynx was normal [Normal Appearance] : the appearance of the neck was normal [No Neck Mass] : no neck mass was observed [No Respiratory Distress] : no respiratory distress [No Acc Muscle Use] : no accessory muscle use [Respiration, Rhythm And Depth] : normal respiratory rhythm and effort [Heart Rate And Rhythm] : heart rate was normal and rhythm regular [Auscultation Breath Sounds / Voice Sounds] : lungs were clear to auscultation bilaterally [Normal S1, S2] : normal S1 and S2 [Murmurs] : no murmurs [Bowel Sounds] : normal bowel sounds [Abdomen Tenderness] : non-tender [No Masses] : no abdominal mass palpated [Abdomen Soft] : soft [] : no hepatosplenomegaly [Oriented To Time, Place, And Person] : oriented to person, place, and time

## 2024-03-05 LAB — CELIACPAN: NORMAL

## 2024-03-13 ENCOUNTER — NON-APPOINTMENT (OUTPATIENT)
Age: 54
End: 2024-03-13

## 2024-03-28 ENCOUNTER — OFFICE (OUTPATIENT)
Dept: URBAN - METROPOLITAN AREA CLINIC 90 | Facility: CLINIC | Age: 54
Setting detail: OPHTHALMOLOGY
End: 2024-03-28
Payer: COMMERCIAL

## 2024-03-28 DIAGNOSIS — H43.393: ICD-10-CM

## 2024-03-28 DIAGNOSIS — H52.7: ICD-10-CM

## 2024-03-28 DIAGNOSIS — H16.223: ICD-10-CM

## 2024-03-28 DIAGNOSIS — H40.013: ICD-10-CM

## 2024-03-28 DIAGNOSIS — H53.10: ICD-10-CM

## 2024-03-28 DIAGNOSIS — H52.4: ICD-10-CM

## 2024-03-28 PROCEDURE — 92083 EXTENDED VISUAL FIELD XM: CPT | Performed by: OPHTHALMOLOGY

## 2024-03-28 PROCEDURE — 92015 DETERMINE REFRACTIVE STATE: CPT | Performed by: OPHTHALMOLOGY

## 2024-03-28 PROCEDURE — 92250 FUNDUS PHOTOGRAPHY W/I&R: CPT | Performed by: OPHTHALMOLOGY

## 2024-03-28 PROCEDURE — 92014 COMPRE OPH EXAM EST PT 1/>: CPT | Performed by: OPHTHALMOLOGY

## 2024-03-28 ASSESSMENT — REFRACTION_CURRENTRX
OD_VPRISM_DIRECTION: SV
OD_CYLINDER: SPH
OD_CYLINDER: SPHERE
OS_SPHERE: +1.00
OS_CYLINDER: SPH
OD_VPRISM_DIRECTION: PROGS
OS_CYLINDER: SPHERE
OS_VPRISM_DIRECTION: PROGS
OD_OVR_VA: 20/
OS_ADD: +1.00
OD_SPHERE: +0.75
OS_AXIS: 115
OD_SPHERE: +1.25
OD_ADD: +1.00
OD_OVR_VA: 20/
OD_SPHERE: +0.75
OD_SPHERE: PLANO
OD_OVR_VA: 20/
OS_OVR_VA: 20/
OS_CYLINDER: -0.25
OD_ADD: +1.25
OS_SPHERE: +0.25
OS_VPRISM_DIRECTION: PROGS
OD_SPHERE: +0.50
OD_VPRISM_DIRECTION: PROGS
OS_AXIS: 117
OS_CYLINDER: -0.50
OD_CYLINDER: SPHERE
OS_ADD: +1.25
OS_VPRISM_DIRECTION: SV
OS_ADD: +1.00
OD_CYLINDER: SPH
OS_SPHERE: +1.25
OS_OVR_VA: 20/
OS_OVR_VA: 20/
OD_ADD: +1.00
OS_SPHERE: +1.00
OS_SPHERE: +0.75

## 2024-03-28 ASSESSMENT — SPHEQUIV_DERIVED
OS_SPHEQUIV: 0.375
OD_SPHEQUIV: 0.875
OD_SPHEQUIV: 0.125

## 2024-03-28 ASSESSMENT — REFRACTION_MANIFEST
OD_SPHERE: +1.00
OD_ADD: +1.25
OD_SPHERE: +0.75
OD_AXIS: 80
OS_CYLINDER: -0.25
OD_SPHERE: -0.50
OD_VA1: 20/20-2
OS_ADD: +1.00
OD_ADD: +1.75
OD_AXIS: 080
OD_ADD: +1.50
OS_AXIS: 135
OD_SPHERE: +0.50
OD_ADD: +1.25
OS_SPHERE: +1.00
OS_SPHERE: +0.50
OS_VA1: 20/25
OD_VA1: 20/20
OD_CYLINDER: -0.25
OS_CYLINDER: S
OS_VA2: 20/25(J1)
OS_ADD: +1.00
OS_VA1: 20/20-1
OD_ADD: +1.00
OS_SPHERE: +0.75
OD_SPHERE: +0.25
OS_SPHERE: +1.25
OS_ADD: +1.00
OD_CYLINDER: -0.25
OS_ADD: +1.75
OD_VA2: 20/25(J1)
OS_ADD: +1.50
OS_SPHERE: PLANO

## 2024-04-18 NOTE — HISTORY OF PRESENT ILLNESS
[de-identified] : CATHY CASTRO  is a 53 year old woman who presents to the [facility] with a chief complaint of throat pain.  She is referred by ~  ~.   They have had pain in their throat for ~  ~ months. They ~  ~ a prior CT neck. They ~  ~ prior smoking history. They ~  ~ prior history of alcoholism/alcohol abuse. Throat pain is ~  ~ when swallowing solids or liquids. They ~  ~ weight loss in the past 3 months. They ~  ~ altered their diet because of this pain. They ~  ~ frequent ear pain.  Pain is ~  ~ when chewing. They ~  ~ a prior swallow study in the past 1 year. They have seen the following types of providers for this problem: ~  ~. They have taken the following medications for this problem: ~  ~ Doing or taking the following makes the pain better: ~  ~ Doing the following makes the pain worse: ~  ~

## 2024-04-23 ENCOUNTER — APPOINTMENT (OUTPATIENT)
Dept: OTOLARYNGOLOGY | Facility: CLINIC | Age: 54
End: 2024-04-23

## 2025-05-30 ENCOUNTER — OFFICE (OUTPATIENT)
Facility: LOCATION | Age: 55
Setting detail: OPHTHALMOLOGY
End: 2025-05-30
Payer: COMMERCIAL

## 2025-05-30 VITALS — HEIGHT: 55 IN

## 2025-05-30 DIAGNOSIS — H40.013: ICD-10-CM

## 2025-05-30 DIAGNOSIS — H25.11: ICD-10-CM

## 2025-05-30 DIAGNOSIS — H01.005: ICD-10-CM

## 2025-05-30 DIAGNOSIS — H01.004: ICD-10-CM

## 2025-05-30 DIAGNOSIS — H43.393: ICD-10-CM

## 2025-05-30 DIAGNOSIS — H01.001: ICD-10-CM

## 2025-05-30 DIAGNOSIS — H16.223: ICD-10-CM

## 2025-05-30 DIAGNOSIS — H53.10: ICD-10-CM

## 2025-05-30 DIAGNOSIS — H01.002: ICD-10-CM

## 2025-05-30 PROCEDURE — 92133 CPTRZD OPH DX IMG PST SGM ON: CPT | Performed by: STUDENT IN AN ORGANIZED HEALTH CARE EDUCATION/TRAINING PROGRAM

## 2025-05-30 PROCEDURE — 92014 COMPRE OPH EXAM EST PT 1/>: CPT | Performed by: STUDENT IN AN ORGANIZED HEALTH CARE EDUCATION/TRAINING PROGRAM

## 2025-05-30 PROCEDURE — 92083 EXTENDED VISUAL FIELD XM: CPT | Performed by: STUDENT IN AN ORGANIZED HEALTH CARE EDUCATION/TRAINING PROGRAM

## 2025-05-30 ASSESSMENT — REFRACTION_MANIFEST
OD_SPHERE: +0.50
OS_VA1: 20/20-1
OD_SPHERE: +1.00
OD_SPHERE: +0.75
OD_CYLINDER: -0.25
OS_ADD: +1.00
OS_AXIS: 135
OS_SPHERE: +0.75
OD_SPHERE: -0.50
OD_AXIS: 80
OD_VA2: 20/25(J1)
OD_AXIS: 080
OS_ADD: +1.00
OS_SPHERE: +1.00
OS_CYLINDER: S
OS_ADD: +1.00
OD_ADD: +1.00
OS_SPHERE: PLANO
OD_VA1: 20/20
OS_CYLINDER: -0.25
OS_VA1: 20/25
OS_SPHERE: +1.25
OS_ADD: +1.75
OD_ADD: +1.75
OD_SPHERE: +0.25
OS_ADD: +1.50
OS_VA2: 20/25(J1)
OD_ADD: +1.25
OS_SPHERE: +0.50
OD_ADD: +1.50
OD_ADD: +1.25
OD_CYLINDER: -0.25
OD_VA1: 20/20-2

## 2025-05-30 ASSESSMENT — REFRACTION_CURRENTRX
OS_SPHERE: +1.25
OD_SPHERE: +0.75
OS_ADD: +1.00
OD_ADD: +1.25
OD_VPRISM_DIRECTION: PROGS
OD_OVR_VA: 20/
OD_VPRISM_DIRECTION: PROGS
OD_OVR_VA: 20/
OS_VPRISM_DIRECTION: PROGS
OD_CYLINDER: -0.75
OD_CYLINDER: SPHERE
OS_AXIS: 115
OS_CYLINDER: -0.25
OS_VPRISM_DIRECTION: SV
OD_ADD: +1.00
OD_AXIS: 054
OD_SPHERE: +0.50
OD_SPHERE: +1.25
OD_SPHERE: PLANO
OD_OVR_VA: 20/
OS_ADD: +1.00
OS_AXIS: 071
OS_SPHERE: +1.00
OS_OVR_VA: 20/
OD_CYLINDER: SPH
OS_CYLINDER: SPHERE
OD_ADD: +0.75
OS_OVR_VA: 20/
OS_ADD: +1.25
OS_SPHERE: +0.25
OD_ADD: +1.00
OD_SPHERE: +0.75
OS_SPHERE: +0.75
OD_CYLINDER: SPHERE
OD_SPHERE: +1.50
OS_CYLINDER: -0.50
OD_CYLINDER: SPH
OD_VPRISM_DIRECTION: SV
OS_ADD: +1.75
OS_CYLINDER: -0.25
OS_CYLINDER: SPH
OS_VPRISM_DIRECTION: PROGS
OS_AXIS: 117
OS_SPHERE: +1.00
OS_OVR_VA: 20/
OS_SPHERE: SPHERE

## 2025-05-30 ASSESSMENT — KERATOMETRY
METHOD_AUTO_MANUAL: AUTO
OS_K2POWER_DIOPTERS: 41.25
OD_AXISANGLE_DEGREES: 101
OD_K2POWER_DIOPTERS: 41.00
OS_K1POWER_DIOPTERS: 41.00
OD_K1POWER_DIOPTERS: 40.75
OS_AXISANGLE_DEGREES: 071

## 2025-05-30 ASSESSMENT — REFRACTION_AUTOREFRACTION
OS_AXIS: 142
OD_SPHERE: +1.00
OS_CYLINDER: -0.50
OS_SPHERE: +1.25
OD_CYLINDER: -0.25
OD_AXIS: 050

## 2025-05-30 ASSESSMENT — CONFRONTATIONAL VISUAL FIELD TEST (CVF)
OD_FINDINGS: FULL
OS_FINDINGS: FULL

## 2025-05-30 ASSESSMENT — PACHYMETRY
OD_CT_UM: 596
OS_CT_CORRECTION: -5
OS_CT_UM: 610
OD_CT_CORRECTION: -4

## 2025-05-30 ASSESSMENT — DRY EYES - PHYSICIAN NOTES
OD_GENERALCOMMENTS: INFERIOR
OS_GENERALCOMMENTS: INFERIOR

## 2025-05-30 ASSESSMENT — VISUAL ACUITY
OD_BCVA: 20/25
OS_BCVA: 20/20

## 2025-05-30 ASSESSMENT — SUPERFICIAL PUNCTATE KERATITIS (SPK)
OS_SPK: 2+
OD_SPK: 2+

## 2025-05-30 ASSESSMENT — TONOMETRY
OS_IOP_MMHG: 15
OD_IOP_MMHG: 17
OS_IOP_MMHG: 15
OD_IOP_MMHG: 17

## 2025-05-30 ASSESSMENT — LID EXAM ASSESSMENTS
OD_BLEPHARITIS: RLL RUL 2+
OS_BLEPHARITIS: LLL LUL 2+